# Patient Record
Sex: MALE | Race: WHITE | NOT HISPANIC OR LATINO
[De-identification: names, ages, dates, MRNs, and addresses within clinical notes are randomized per-mention and may not be internally consistent; named-entity substitution may affect disease eponyms.]

---

## 2018-10-26 PROBLEM — Z00.00 ENCOUNTER FOR PREVENTIVE HEALTH EXAMINATION: Status: ACTIVE | Noted: 2018-10-26

## 2018-10-31 ENCOUNTER — APPOINTMENT (OUTPATIENT)
Dept: SPINE | Facility: CLINIC | Age: 53
End: 2018-10-31
Payer: COMMERCIAL

## 2018-10-31 VITALS
WEIGHT: 230 LBS | SYSTOLIC BLOOD PRESSURE: 135 MMHG | HEART RATE: 90 BPM | DIASTOLIC BLOOD PRESSURE: 92 MMHG | HEIGHT: 67 IN | BODY MASS INDEX: 36.1 KG/M2

## 2018-10-31 DIAGNOSIS — Z86.79 PERSONAL HISTORY OF OTHER DISEASES OF THE CIRCULATORY SYSTEM: ICD-10-CM

## 2018-10-31 PROCEDURE — 99244 OFF/OP CNSLTJ NEW/EST MOD 40: CPT

## 2018-10-31 RX ORDER — QUETIAPINE 100 MG/1
100 TABLET, FILM COATED ORAL
Refills: 0 | Status: ACTIVE | COMMUNITY

## 2018-10-31 RX ORDER — BUPROPION HYDROCHLORIDE 150 MG/1
150 TABLET, EXTENDED RELEASE ORAL
Refills: 0 | Status: ACTIVE | COMMUNITY

## 2018-10-31 RX ORDER — BECLOMETHASONE DIPROPIONATE HFA 80 UG/1
80 AEROSOL, METERED RESPIRATORY (INHALATION)
Refills: 0 | Status: ACTIVE | COMMUNITY

## 2018-10-31 RX ORDER — MONTELUKAST SODIUM 10 MG/1
10 TABLET, FILM COATED ORAL
Refills: 0 | Status: ACTIVE | COMMUNITY

## 2018-10-31 RX ORDER — ERGOCALCIFEROL (VITAMIN D2) 1250 MCG
50000 CAPSULE ORAL
Refills: 0 | Status: ACTIVE | COMMUNITY

## 2018-10-31 RX ORDER — CITALOPRAM 40 MG/1
40 TABLET, FILM COATED ORAL
Refills: 0 | Status: ACTIVE | COMMUNITY

## 2018-10-31 RX ORDER — ONABOTULINUMTOXINA 200 [USP'U]/1
200 INJECTION, POWDER, LYOPHILIZED, FOR SOLUTION INTRADERMAL; INTRAMUSCULAR
Refills: 0 | Status: ACTIVE | COMMUNITY

## 2018-11-28 ENCOUNTER — OUTPATIENT (OUTPATIENT)
Dept: OUTPATIENT SERVICES | Facility: HOSPITAL | Age: 53
LOS: 1 days | End: 2018-11-28
Payer: COMMERCIAL

## 2018-11-28 VITALS
RESPIRATION RATE: 20 BRPM | TEMPERATURE: 98 F | HEART RATE: 74 BPM | OXYGEN SATURATION: 98 % | DIASTOLIC BLOOD PRESSURE: 74 MMHG | SYSTOLIC BLOOD PRESSURE: 137 MMHG | WEIGHT: 220.02 LBS | HEIGHT: 67 IN

## 2018-11-28 DIAGNOSIS — M54.42 LUMBAGO WITH SCIATICA, LEFT SIDE: ICD-10-CM

## 2018-11-28 DIAGNOSIS — I42.8 OTHER CARDIOMYOPATHIES: ICD-10-CM

## 2018-11-28 DIAGNOSIS — Z91.040 LATEX ALLERGY STATUS: ICD-10-CM

## 2018-11-28 DIAGNOSIS — Z98.1 ARTHRODESIS STATUS: Chronic | ICD-10-CM

## 2018-11-28 DIAGNOSIS — Z98.890 OTHER SPECIFIED POSTPROCEDURAL STATES: Chronic | ICD-10-CM

## 2018-11-28 DIAGNOSIS — Z01.818 ENCOUNTER FOR OTHER PREPROCEDURAL EXAMINATION: ICD-10-CM

## 2018-11-28 DIAGNOSIS — Z98.84 BARIATRIC SURGERY STATUS: Chronic | ICD-10-CM

## 2018-11-28 DIAGNOSIS — G47.33 OBSTRUCTIVE SLEEP APNEA (ADULT) (PEDIATRIC): ICD-10-CM

## 2018-11-28 LAB
MRSA PCR RESULT.: SIGNIFICANT CHANGE UP
S AUREUS DNA NOSE QL NAA+PROBE: SIGNIFICANT CHANGE UP

## 2018-11-28 RX ORDER — CEFAZOLIN SODIUM 1 G
2000 VIAL (EA) INJECTION ONCE
Qty: 0 | Refills: 0 | Status: DISCONTINUED | OUTPATIENT
Start: 2018-12-10 | End: 2018-12-25

## 2018-11-28 RX ORDER — LIDOCAINE HCL 20 MG/ML
0.2 VIAL (ML) INJECTION ONCE
Qty: 0 | Refills: 0 | Status: DISCONTINUED | OUTPATIENT
Start: 2018-12-10 | End: 2018-12-25

## 2018-11-28 NOTE — H&P PST ADULT - PMH
Asthma  mild - deneis any exacerbation  Chronic bilateral low back pain with left-sided sciatica    History of MI (myocardial infarction)  1998  IRVING (obstructive sleep apnea) Asthma  mild - denies  any exacerbation  Chronic bilateral low back pain with left-sided sciatica    Chronic left-sided low back pain with left-sided sciatica    GERD (gastroesophageal reflux disease)    History of MI (myocardial infarction)  1998  Lumbago with sciatica, left side    IRVING (obstructive sleep apnea)  CPAP  Valvular cardiomyopathy

## 2018-11-28 NOTE — H&P PST ADULT - HISTORY OF PRESENT ILLNESS
52 y/o gentle man who has had a long history of back pain since he was 28 years old s/p cervical fusion 2012/ front and back lumbar fusion in 2015 . Pt states that he has been experiencing severe back pain , radiating towards to right  groin and leg with weakness for 1 year followed by pain management with multiple conservative management with no improvement .  He ultimately had a spinal cord stimulator trial with improvement his back pain . Presents to  PST for scheduled Percutaneous Placement of spinal cord stimulator Battery on 12/10/2018.

## 2018-11-28 NOTE — H&P PST ADULT - PROBLEM SELECTOR PLAN 1
Percutaneous Placement of spinal cord stimulator Battery on 12/10/2018  Pre- Op instructions discussed   Labs done by PMD this week - will call for results   MRSA sent

## 2018-11-28 NOTE — H&P PST ADULT - PSH
[facility]  History of lumbar fusion  2015 - [facility]  S/P cervical spinal fusion  2012 ( [facility])  S/P insertion of spinal cord stimulator  Trial- 2018 History of gastric bypass  2007( [facility] )  History of lumbar fusion  2015 - [facility]  S/P cervical spinal fusion  2012 ( [facility])  S/P insertion of spinal cord stimulator  Trial- 2018

## 2018-11-28 NOTE — H&P PST ADULT - PRO PAIN LIFE ADAPT
inability to enjoy life/inability to concentrate/decreased appetite/decreased activity level/inability or reluctance to perform ADLs

## 2018-11-29 PROBLEM — G47.33 OBSTRUCTIVE SLEEP APNEA (ADULT) (PEDIATRIC): Chronic | Status: ACTIVE | Noted: 2018-11-28

## 2018-12-05 ENCOUNTER — OTHER (OUTPATIENT)
Age: 53
End: 2018-12-05

## 2018-12-09 ENCOUNTER — TRANSCRIPTION ENCOUNTER (OUTPATIENT)
Age: 53
End: 2018-12-09

## 2018-12-09 VITALS
OXYGEN SATURATION: 100 % | SYSTOLIC BLOOD PRESSURE: 123 MMHG | RESPIRATION RATE: 20 BRPM | WEIGHT: 220.02 LBS | TEMPERATURE: 98 F | HEART RATE: 86 BPM | HEIGHT: 67 IN | DIASTOLIC BLOOD PRESSURE: 82 MMHG

## 2018-12-09 NOTE — PRE-ANESTHESIA EVALUATION ADULT - NSANTHADDINFOFT_GEN_ALL_CORE
-post-op IRVING precautions -post-op IRVING precautions  -C-spine precautions  -possible difficult airway (d/w pt)

## 2018-12-10 ENCOUNTER — APPOINTMENT (OUTPATIENT)
Dept: SPINE | Facility: HOSPITAL | Age: 53
End: 2018-12-10

## 2018-12-10 ENCOUNTER — OUTPATIENT (OUTPATIENT)
Dept: OUTPATIENT SERVICES | Facility: HOSPITAL | Age: 53
LOS: 1 days | End: 2018-12-10
Payer: COMMERCIAL

## 2018-12-10 VITALS
HEART RATE: 95 BPM | DIASTOLIC BLOOD PRESSURE: 75 MMHG | TEMPERATURE: 98 F | OXYGEN SATURATION: 97 % | SYSTOLIC BLOOD PRESSURE: 126 MMHG | RESPIRATION RATE: 18 BRPM

## 2018-12-10 DIAGNOSIS — Z01.818 ENCOUNTER FOR OTHER PREPROCEDURAL EXAMINATION: ICD-10-CM

## 2018-12-10 DIAGNOSIS — Z98.1 ARTHRODESIS STATUS: Chronic | ICD-10-CM

## 2018-12-10 DIAGNOSIS — M54.42 LUMBAGO WITH SCIATICA, LEFT SIDE: ICD-10-CM

## 2018-12-10 DIAGNOSIS — Z98.890 OTHER SPECIFIED POSTPROCEDURAL STATES: Chronic | ICD-10-CM

## 2018-12-10 DIAGNOSIS — Z98.84 BARIATRIC SURGERY STATUS: Chronic | ICD-10-CM

## 2018-12-10 PROCEDURE — 76000 FLUOROSCOPY <1 HR PHYS/QHP: CPT

## 2018-12-10 PROCEDURE — 95972 ALYS CPLX SP/PN NPGT W/PRGRM: CPT

## 2018-12-10 PROCEDURE — 93005 ELECTROCARDIOGRAM TRACING: CPT

## 2018-12-10 PROCEDURE — G0463: CPT

## 2018-12-10 PROCEDURE — 63685 INS/RPLC SPI NPG/RCVR POCKET: CPT

## 2018-12-10 PROCEDURE — 87641 MR-STAPH DNA AMP PROBE: CPT

## 2018-12-10 PROCEDURE — C1787: CPT

## 2018-12-10 PROCEDURE — 63650 IMPLANT NEUROELECTRODES: CPT

## 2018-12-10 PROCEDURE — 94660 CPAP INITIATION&MGMT: CPT

## 2018-12-10 PROCEDURE — C1820: CPT

## 2018-12-10 PROCEDURE — C1889: CPT

## 2018-12-10 PROCEDURE — 87640 STAPH A DNA AMP PROBE: CPT

## 2018-12-10 PROCEDURE — C1778: CPT

## 2018-12-10 PROCEDURE — 93010 ELECTROCARDIOGRAM REPORT: CPT

## 2018-12-10 RX ORDER — ONDANSETRON 8 MG/1
4 TABLET, FILM COATED ORAL ONCE
Qty: 0 | Refills: 0 | Status: DISCONTINUED | OUTPATIENT
Start: 2018-12-10 | End: 2018-12-10

## 2018-12-10 RX ORDER — OXYCODONE HYDROCHLORIDE 5 MG/1
10 TABLET ORAL ONCE
Qty: 0 | Refills: 0 | Status: DISCONTINUED | OUTPATIENT
Start: 2018-12-10 | End: 2018-12-10

## 2018-12-10 RX ORDER — METOCLOPRAMIDE HCL 10 MG
10 TABLET ORAL ONCE
Qty: 0 | Refills: 0 | Status: DISCONTINUED | OUTPATIENT
Start: 2018-12-10 | End: 2018-12-10

## 2018-12-10 RX ORDER — OXYCODONE HYDROCHLORIDE 5 MG/1
5 TABLET ORAL ONCE
Qty: 0 | Refills: 0 | Status: DISCONTINUED | OUTPATIENT
Start: 2018-12-10 | End: 2018-12-10

## 2018-12-10 RX ORDER — HYDROMORPHONE HYDROCHLORIDE 2 MG/ML
0.5 INJECTION INTRAMUSCULAR; INTRAVENOUS; SUBCUTANEOUS ONCE
Qty: 0 | Refills: 0 | Status: DISCONTINUED | OUTPATIENT
Start: 2018-12-10 | End: 2018-12-10

## 2018-12-10 RX ORDER — ASPIRIN/CALCIUM CARB/MAGNESIUM 324 MG
81 TABLET ORAL DAILY
Qty: 0 | Refills: 0 | Status: DISCONTINUED | OUTPATIENT
Start: 2018-12-10 | End: 2018-12-25

## 2018-12-10 RX ORDER — HYDROMORPHONE HYDROCHLORIDE 2 MG/ML
0.5 INJECTION INTRAMUSCULAR; INTRAVENOUS; SUBCUTANEOUS
Qty: 0 | Refills: 0 | Status: DISCONTINUED | OUTPATIENT
Start: 2018-12-10 | End: 2018-12-10

## 2018-12-10 RX ORDER — CYCLOBENZAPRINE HYDROCHLORIDE 10 MG/1
10 TABLET, FILM COATED ORAL THREE TIMES A DAY
Qty: 0 | Refills: 0 | Status: DISCONTINUED | OUTPATIENT
Start: 2018-12-10 | End: 2018-12-25

## 2018-12-10 RX ORDER — SODIUM CHLORIDE 9 MG/ML
1000 INJECTION, SOLUTION INTRAVENOUS
Qty: 0 | Refills: 0 | Status: DISCONTINUED | OUTPATIENT
Start: 2018-12-10 | End: 2018-12-25

## 2018-12-10 RX ORDER — MONTELUKAST 4 MG/1
10 TABLET, CHEWABLE ORAL AT BEDTIME
Qty: 0 | Refills: 0 | Status: DISCONTINUED | OUTPATIENT
Start: 2018-12-10 | End: 2018-12-25

## 2018-12-10 RX ORDER — CEPHALEXIN 500 MG
500 CAPSULE ORAL
Qty: 0 | Refills: 0 | Status: DISCONTINUED | OUTPATIENT
Start: 2018-12-10 | End: 2018-12-10

## 2018-12-10 RX ORDER — SODIUM CHLORIDE 9 MG/ML
3 INJECTION INTRAMUSCULAR; INTRAVENOUS; SUBCUTANEOUS EVERY 8 HOURS
Qty: 0 | Refills: 0 | Status: DISCONTINUED | OUTPATIENT
Start: 2018-12-10 | End: 2018-12-10

## 2018-12-10 RX ORDER — CITALOPRAM 10 MG/1
40 TABLET, FILM COATED ORAL DAILY
Qty: 0 | Refills: 0 | Status: DISCONTINUED | OUTPATIENT
Start: 2018-12-10 | End: 2018-12-25

## 2018-12-10 RX ORDER — CEPHALEXIN 500 MG
1 CAPSULE ORAL
Qty: 4 | Refills: 0 | OUTPATIENT
Start: 2018-12-10

## 2018-12-10 RX ORDER — QUETIAPINE FUMARATE 200 MG/1
100 TABLET, FILM COATED ORAL AT BEDTIME
Qty: 0 | Refills: 0 | Status: DISCONTINUED | OUTPATIENT
Start: 2018-12-10 | End: 2018-12-25

## 2018-12-10 RX ORDER — OXYCODONE HYDROCHLORIDE 5 MG/1
1 TABLET ORAL
Qty: 6 | Refills: 0 | OUTPATIENT
Start: 2018-12-10

## 2018-12-10 RX ORDER — GABAPENTIN 400 MG/1
300 CAPSULE ORAL ONCE
Qty: 0 | Refills: 0 | Status: DISCONTINUED | OUTPATIENT
Start: 2018-12-10 | End: 2018-12-10

## 2018-12-10 RX ORDER — ACETAMINOPHEN 500 MG
1000 TABLET ORAL ONCE
Qty: 0 | Refills: 0 | Status: COMPLETED | OUTPATIENT
Start: 2018-12-10 | End: 2018-12-10

## 2018-12-10 RX ADMIN — SODIUM CHLORIDE 100 MILLILITER(S): 9 INJECTION, SOLUTION INTRAVENOUS at 14:15

## 2018-12-10 RX ADMIN — OXYCODONE HYDROCHLORIDE 10 MILLIGRAM(S): 5 TABLET ORAL at 16:23

## 2018-12-10 RX ADMIN — CYCLOBENZAPRINE HYDROCHLORIDE 10 MILLIGRAM(S): 10 TABLET, FILM COATED ORAL at 16:20

## 2018-12-10 RX ADMIN — Medication 400 MILLIGRAM(S): at 17:06

## 2018-12-10 NOTE — ASU DISCHARGE PLAN (ADULT/PEDIATRIC). - DRESSING FT
You may remove the outer dressings on Wednesday and shower on Thursday. Let the SteriStrips fall off

## 2018-12-10 NOTE — ASU DISCHARGE PLAN (ADULT/PEDIATRIC). - NURSING INSTRUCTIONS
your last dose of 1000mg tylenol was at 5pm  your last dose of flexaril and 10mg oxycodone was at 4pm

## 2018-12-10 NOTE — ASU DISCHARGE PLAN (ADULT/PEDIATRIC). - MEDICATION SUMMARY - MEDICATIONS TO TAKE
I will START or STAY ON the medications listed below when I get home from the hospital:    aspirin 81 mg oral tablet  -- 1 tab(s) by mouth once a day prophylaxis  -- Indication: For Heart disease    oxyCODONE 5 mg oral tablet  -- 1 tab(s) by mouth every 6 hours, As Needed  -- Indication: For Pain - In the postoperative period, you may take this medication every 4 to 6 hours.     oxyCODONE 5 mg oral tablet  -- 1 tab(s) by mouth every 4 to 6 hours, As Needed -Moderate Pain (4 - 6) MDD:6 tablets  -- Indication: For Pain - In the postoperative period, you may take this medication every 4 to 6 hours.    CeleXA 40 mg oral tablet  -- 1 tab(s) by mouth once a day (at bedtime)  -- Indication: For Antidepressant    SEROquel 100 mg oral tablet  -- 1 tab(s) by mouth once a day (at bedtime)  -- Indication: For Mood    cephalexin 500 mg oral capsule  -- 1 cap(s) by mouth 4 times a day for 4 doses   -- Indication: For Postoperative antibiotic     Singulair 10 mg oral tablet  -- 1 tab(s) by mouth once a day (at bedtime)  -- Indication: For Allergies    Flexeril 10 mg oral tablet  -- 1 tab(s) by mouth 3 times a day, As Needed  -- Indication: For Back spasms    Multiple Vitamins oral tablet  -- 1 tab(s) by mouth once a day  -- Indication: For Home medication    Vitamin B-12 100 mcg oral tablet  -- 1 tab(s) by mouth once a day  -- Indication: For Home medication    Vitamin D3  -- every 2 weeks  -- Indication: For Home medication

## 2018-12-10 NOTE — ASU DISCHARGE PLAN (ADULT/PEDIATRIC). - NOTIFY
Fever greater than 101/Unable to Urinate/Pain not relieved by Medications/Bleeding that does not stop/Swelling that continues/Inability to Tolerate Liquids or Foods

## 2018-12-10 NOTE — BRIEF OPERATIVE NOTE - PROCEDURE
<<-----Click on this checkbox to enter Procedure Implantation of spinal cord nerve stimulator  12/10/2018    Active  [id number]

## 2018-12-10 NOTE — ASU DISCHARGE PLAN (ADULT/PEDIATRIC). - MEDICATION SUMMARY - MEDICATIONS TO CHANGE
I will SWITCH the dose or number of times a day I take the medications listed below when I get home from the hospital:    oxyCODONE 5 mg oral tablet  -- 1 tab(s) by mouth every 6 hours, As Needed

## 2018-12-11 PROBLEM — K21.9 GASTRO-ESOPHAGEAL REFLUX DISEASE WITHOUT ESOPHAGITIS: Chronic | Status: ACTIVE | Noted: 2018-11-28

## 2018-12-12 ENCOUNTER — TRANSCRIPTION ENCOUNTER (OUTPATIENT)
Age: 53
End: 2018-12-12

## 2018-12-17 ENCOUNTER — OTHER (OUTPATIENT)
Age: 53
End: 2018-12-17

## 2018-12-21 ENCOUNTER — APPOINTMENT (OUTPATIENT)
Dept: SPINE | Facility: CLINIC | Age: 53
End: 2018-12-21
Payer: COMMERCIAL

## 2018-12-21 VITALS
HEIGHT: 67 IN | SYSTOLIC BLOOD PRESSURE: 122 MMHG | HEART RATE: 106 BPM | BODY MASS INDEX: 36.1 KG/M2 | DIASTOLIC BLOOD PRESSURE: 76 MMHG | TEMPERATURE: 98.3 F | WEIGHT: 230 LBS

## 2018-12-21 PROCEDURE — 95972 ALYS CPLX SP/PN NPGT W/PRGRM: CPT

## 2018-12-21 PROCEDURE — 99213 OFFICE O/P EST LOW 20 MIN: CPT

## 2018-12-21 RX ORDER — PNV NO.95/FERROUS FUM/FOLIC AC 28MG-0.8MG
TABLET ORAL
Refills: 0 | Status: ACTIVE | COMMUNITY

## 2018-12-21 RX ORDER — MULTIVIT-MIN/IRON/FOLIC ACID/K 18-600-40
CAPSULE ORAL
Refills: 0 | Status: ACTIVE | COMMUNITY

## 2019-02-08 ENCOUNTER — CLINICAL ADVICE (OUTPATIENT)
Age: 54
End: 2019-02-08

## 2019-02-12 ENCOUNTER — TRANSCRIPTION ENCOUNTER (OUTPATIENT)
Age: 54
End: 2019-02-12

## 2019-02-13 ENCOUNTER — APPOINTMENT (OUTPATIENT)
Dept: SPINE | Facility: CLINIC | Age: 54
End: 2019-02-13
Payer: COMMERCIAL

## 2019-02-13 VITALS
BODY MASS INDEX: 35.31 KG/M2 | OXYGEN SATURATION: 98 % | HEART RATE: 91 BPM | SYSTOLIC BLOOD PRESSURE: 118 MMHG | TEMPERATURE: 98.2 F | WEIGHT: 225 LBS | DIASTOLIC BLOOD PRESSURE: 75 MMHG | RESPIRATION RATE: 15 BRPM | HEIGHT: 67 IN

## 2019-02-13 PROCEDURE — 99213 OFFICE O/P EST LOW 20 MIN: CPT

## 2019-02-13 RX ORDER — CYCLOBENZAPRINE HYDROCHLORIDE 10 MG/1
10 TABLET, FILM COATED ORAL
Refills: 0 | Status: DISCONTINUED | COMMUNITY
End: 2019-02-13

## 2019-02-13 RX ORDER — OXYCODONE 5 MG/1
5 TABLET ORAL
Refills: 0 | Status: DISCONTINUED | COMMUNITY
End: 2019-02-13

## 2019-03-05 ENCOUNTER — OTHER (OUTPATIENT)
Age: 54
End: 2019-03-05

## 2019-03-27 ENCOUNTER — APPOINTMENT (OUTPATIENT)
Dept: SPINE | Facility: CLINIC | Age: 54
End: 2019-03-27
Payer: COMMERCIAL

## 2019-03-27 VITALS
HEART RATE: 96 BPM | BODY MASS INDEX: 35.31 KG/M2 | DIASTOLIC BLOOD PRESSURE: 76 MMHG | WEIGHT: 225 LBS | HEIGHT: 67 IN | SYSTOLIC BLOOD PRESSURE: 138 MMHG

## 2019-03-27 PROCEDURE — 99214 OFFICE O/P EST MOD 30 MIN: CPT

## 2019-04-26 ENCOUNTER — OUTPATIENT (OUTPATIENT)
Dept: OUTPATIENT SERVICES | Facility: HOSPITAL | Age: 54
LOS: 1 days | End: 2019-04-26
Payer: COMMERCIAL

## 2019-04-26 VITALS
HEART RATE: 68 BPM | RESPIRATION RATE: 16 BRPM | SYSTOLIC BLOOD PRESSURE: 124 MMHG | TEMPERATURE: 98 F | WEIGHT: 218.04 LBS | HEIGHT: 66 IN | DIASTOLIC BLOOD PRESSURE: 87 MMHG | OXYGEN SATURATION: 98 %

## 2019-04-26 DIAGNOSIS — Z98.1 ARTHRODESIS STATUS: Chronic | ICD-10-CM

## 2019-04-26 DIAGNOSIS — Z98.890 OTHER SPECIFIED POSTPROCEDURAL STATES: Chronic | ICD-10-CM

## 2019-04-26 DIAGNOSIS — Z98.84 BARIATRIC SURGERY STATUS: Chronic | ICD-10-CM

## 2019-04-26 DIAGNOSIS — Z01.818 ENCOUNTER FOR OTHER PREPROCEDURAL EXAMINATION: ICD-10-CM

## 2019-04-26 DIAGNOSIS — Z98.1 ARTHRODESIS STATUS: ICD-10-CM

## 2019-04-26 DIAGNOSIS — M51.26 OTHER INTERVERTEBRAL DISC DISPLACEMENT, LUMBAR REGION: ICD-10-CM

## 2019-04-26 DIAGNOSIS — G47.33 OBSTRUCTIVE SLEEP APNEA (ADULT) (PEDIATRIC): ICD-10-CM

## 2019-04-26 LAB
ANION GAP SERPL CALC-SCNC: 12 MMOL/L — SIGNIFICANT CHANGE UP (ref 5–17)
BUN SERPL-MCNC: 23 MG/DL — SIGNIFICANT CHANGE UP (ref 7–23)
CALCIUM SERPL-MCNC: 9.8 MG/DL — SIGNIFICANT CHANGE UP (ref 8.4–10.5)
CHLORIDE SERPL-SCNC: 102 MMOL/L — SIGNIFICANT CHANGE UP (ref 96–108)
CO2 SERPL-SCNC: 27 MMOL/L — SIGNIFICANT CHANGE UP (ref 22–31)
CREAT SERPL-MCNC: 0.92 MG/DL — SIGNIFICANT CHANGE UP (ref 0.5–1.3)
GLUCOSE SERPL-MCNC: 88 MG/DL — SIGNIFICANT CHANGE UP (ref 70–99)
HCT VFR BLD CALC: 42.4 % — SIGNIFICANT CHANGE UP (ref 39–50)
HGB BLD-MCNC: 13.4 G/DL — SIGNIFICANT CHANGE UP (ref 13–17)
MCHC RBC-ENTMCNC: 31.3 PG — SIGNIFICANT CHANGE UP (ref 27–34)
MCHC RBC-ENTMCNC: 31.6 GM/DL — LOW (ref 32–36)
MCV RBC AUTO: 99.1 FL — SIGNIFICANT CHANGE UP (ref 80–100)
MRSA PCR RESULT.: SIGNIFICANT CHANGE UP
PLATELET # BLD AUTO: 257 K/UL — SIGNIFICANT CHANGE UP (ref 150–400)
POTASSIUM SERPL-MCNC: 4.6 MMOL/L — SIGNIFICANT CHANGE UP (ref 3.5–5.3)
POTASSIUM SERPL-SCNC: 4.6 MMOL/L — SIGNIFICANT CHANGE UP (ref 3.5–5.3)
RBC # BLD: 4.28 M/UL — SIGNIFICANT CHANGE UP (ref 4.2–5.8)
RBC # FLD: 13.3 % — SIGNIFICANT CHANGE UP (ref 10.3–14.5)
S AUREUS DNA NOSE QL NAA+PROBE: SIGNIFICANT CHANGE UP
SODIUM SERPL-SCNC: 141 MMOL/L — SIGNIFICANT CHANGE UP (ref 135–145)
WBC # BLD: 5.92 K/UL — SIGNIFICANT CHANGE UP (ref 3.8–10.5)
WBC # FLD AUTO: 5.92 K/UL — SIGNIFICANT CHANGE UP (ref 3.8–10.5)

## 2019-04-26 PROCEDURE — 87640 STAPH A DNA AMP PROBE: CPT

## 2019-04-26 PROCEDURE — 93010 ELECTROCARDIOGRAM REPORT: CPT

## 2019-04-26 PROCEDURE — 93005 ELECTROCARDIOGRAM TRACING: CPT

## 2019-04-26 PROCEDURE — G0463: CPT

## 2019-04-26 PROCEDURE — 80048 BASIC METABOLIC PNL TOTAL CA: CPT

## 2019-04-26 PROCEDURE — 85027 COMPLETE CBC AUTOMATED: CPT

## 2019-04-26 RX ORDER — OXYCODONE HYDROCHLORIDE 5 MG/1
1 TABLET ORAL
Qty: 0 | Refills: 0 | COMMUNITY

## 2019-04-26 RX ORDER — CEFAZOLIN SODIUM 1 G
2000 VIAL (EA) INJECTION ONCE
Refills: 0 | Status: DISCONTINUED | OUTPATIENT
Start: 2019-05-10 | End: 2019-05-25

## 2019-04-26 RX ORDER — ASPIRIN/CALCIUM CARB/MAGNESIUM 324 MG
1 TABLET ORAL
Qty: 0 | Refills: 0 | COMMUNITY

## 2019-04-26 RX ORDER — CHOLECALCIFEROL (VITAMIN D3) 125 MCG
0 CAPSULE ORAL
Qty: 0 | Refills: 0 | COMMUNITY

## 2019-04-26 RX ORDER — PREGABALIN 225 MG/1
1 CAPSULE ORAL
Qty: 0 | Refills: 0 | COMMUNITY

## 2019-04-26 NOTE — H&P PST ADULT - PRO PAIN LIFE ADAPT
decreased activity level/decreased appetite/inability to concentrate/inability or reluctance to perform ADLs/inability to enjoy life

## 2019-04-26 NOTE — H&P PST ADULT - VISION (WITH CORRECTIVE LENSES IF THE PATIENT USUALLY WEARS THEM):
Normal vision: sees adequately in most situations; can see medication labels, newsprint reading glasses/Partially impaired: cannot see medication labels or newsprint, but can see obstacles in path, and the surrounding layout; can count fingers at arm's length

## 2019-04-26 NOTE — H&P PST ADULT - NSICDXPROBLEM_GEN_ALL_CORE_FT
PROBLEM DIAGNOSES  Problem: Lumbar herniated disc  Assessment and Plan: Right L2-3 Lumbar Laminectomy/ Disectomy    Problem: IRVING (obstructive sleep apnea)  Assessment and Plan: Observe IRVING precautions PROBLEM DIAGNOSES  Problem: Lumbar herniated disc  Assessment and Plan: Right L2-3 Lumbar Laminectomy/ Disectomy    * preop Medical evaluation done 4-25-19 with Dr. Ma, PMD    Problem: IRVING (obstructive sleep apnea)  Assessment and Plan: Observe IRVING precautions    Problem: S/P spinal fusion  Assessment and Plan: Caution with positioning

## 2019-04-26 NOTE — H&P PST ADULT - NSICDXPASTSURGICALHX_GEN_ALL_CORE_FT
PAST SURGICAL HISTORY:  [facility]    [facility] fusion 2015 - [facility]    S/P cervical spinal fusion 2012 ( [facility])    S/P insertion of spinal cord stimulator Trial- 2018 PAST SURGICAL HISTORY:  [facility] of [facility]    [facility] of lumbar fusion 2015 - [facility]    S/P cervical spinal fusion 2012 ( [facility])    S/P insertion of spinal cord stimulator Trial- 2018 PAST SURGICAL HISTORY:  [facility] of [facility]    [facility] of lumbar fusion 2015 - [facility]    S/P cervical spinal fusion 2012 ( [facility])    S/P insertion of spinal cord stimulator Trial- 2018 PAST SURGICAL HISTORY:  [facility] of [facility]    [facility] of lumbar fusion 2015 - [facility]    S/P cervical spinal fusion 2012 ( [facility])    S/P insertion of spinal cord stimulator Trial- 2018

## 2019-04-26 NOTE — H&P PST ADULT - HISTORY OF PRESENT ILLNESS
54 y/o gentle man who has had a long history of back pain since he was 28 years old s/p cervical fusion 2012/ front and back lumbar fusion in 2015 . Pt states that he has been experiencing severe back pain , radiating towards to right  groin and leg with weakness for 1 year followed by pain management with multiple conservative management with no improvement .  He ultimately had a spinal cord stimulator trial with improvement his back pain . Presents to  PST for scheduled Percutaneous Placement of spinal cord stimulator Battery on 12/10/2018. 55 y/o gentle man who has had a long history of back pain since he was 28 years old s/p cervical fusion 2012/ front and back lumbar fusion in 2015 . Pt states that he has been experiencing severe back pain , radiating towards to right  groin and leg with weakness for 1 year followed by pain management with multiple conservative management with no improvement .  He ultimately had a spinal cord stimulator trial with improvement his back pain . Presents to  PST for scheduled Percutaneous Placement of spinal cord stimulator Battery on 12/10/2018. This is a 55 year old male with PMH: 53 y/o gentle man who has had a long history of back pain since he was 28 years old s/p cervical fusion 2012/ front and back lumbar fusion in 2015 . Pt states that he has been experiencing severe back pain , radiating towards to right  groin and leg with weakness for 1 year followed by pain management with multiple conservative management with no improvement .  He ultimately had a spinal cord stimulator trial with improvement his back pain . Presents to  PST for scheduled Percutaneous Placement of spinal cord stimulator Battery on 12/10/2018. This is a 55 year old male with PMH: mild Asthma, IRVING: uses CPAP nightly, s/p ( 07): Rou-en-y Gastric Bypass for Morbid Obesity: current BMI 35.2,  ('98): + MI @ age 32: Cardiac Workup: NO obstructive coronary disease, NO intervention done, HTN, HLD, Anxiety, Depression, Migraine, Herniated Cervical Disc : s/p ('12): Cervical Fusion:  current * limited ROM of Neck, ('15): Lumbar Herniated Disc: s/p Lumbar Fusion. Lumbar radiculopathy persists: s/p ( 11/2018): Spinal Cord Stimulator Trial with good pain relief. Had accidental fall 2 weeks after with lumbar injury. Permanent Spinal Cord Stimulator placed few weeks after: No pain relief. evaluated, dx: Herniated Lumbar Disc. Now scheduled: Right L2-3 Lumbar Laminectomy Discectomy.

## 2019-04-26 NOTE — H&P PST ADULT - OTHER CARE PROVIDERS
cardiology   -  Dr Saurabh Chen  [phone]  , Pain management  Dr Rodriguez  [phone] cardiology   -  Dr Abhinav Chen  [phone]  , Pain management  Dr Rodriguez  [phone]    [facility]

## 2019-04-26 NOTE — H&P PST ADULT - NSICDXPASTMEDICALHX_GEN_ALL_CORE_FT
PAST MEDICAL HISTORY:  Asthma mild - denies  any exacerbation    Chronic bilateral low back pain with left-sided sciatica     Chronic left-sided low back pain with left-sided sciatica     GERD (gastroesophageal reflux disease)     History of MI (myocardial infarction) 1998    Lumbago with sciatica, left side     IRVING (obstructive sleep apnea) CPAP    Valvular cardiomyopathy PAST MEDICAL HISTORY:  Asthma mild    Chronic bilateral low back pain with left-sided sciatica     Chronic left-sided low back pain with left-sided sciatica     GERD (gastroesophageal reflux disease)     History of MI (myocardial infarction) 1998    Insomnia     Lumbago with sciatica, left side     Morbid obesity surgically treated '2007:  Gastric Bypass    IRVING (obstructive sleep apnea) CPAP    Valvular cardiomyopathy     Vitamin D deficiency PAST MEDICAL HISTORY:  Anxiety and depression     Asthma mild    Chronic bilateral low back pain with left-sided sciatica     Chronic left-sided low back pain with left-sided sciatica     GERD (gastroesophageal reflux disease)     History of MI (myocardial infarction) 1998  @ age 32    Hyperlipidemia     Insomnia     Iron deficiency anemia     Kidney stones ' 2010  passed. no surgery    Lumbago with sciatica, left side     Lumbar herniated disc     Migraine meds prn and Botox injections every 3 months    Morbid obesity surgically treated '2007:  Gastric Bypass    IRVING (obstructive sleep apnea) CPAP    Radiculopathy of lumbar region     Valvular cardiomyopathy     Vitamin D deficiency PAST MEDICAL HISTORY:  Anxiety and depression     Asthma mild    GERD (gastroesophageal reflux disease)     History of MI (myocardial infarction) 1998  @ age 32    Hyperlipidemia     Insomnia     Iron deficiency anemia     Kidney stones ' 2010  passed. no surgery    Lumbar herniated disc     Migraine meds prn and Botox injections every 3 months    Morbid obesity surgically treated '2007:  Gastric Bypass    IRVING (obstructive sleep apnea) CPAP    Radiculopathy of lumbar region     Vitamin D deficiency

## 2019-04-27 PROBLEM — E66.01 MORBID (SEVERE) OBESITY DUE TO EXCESS CALORIES: Chronic | Status: ACTIVE | Noted: 2019-04-26

## 2019-04-27 PROBLEM — N20.0 CALCULUS OF KIDNEY: Chronic | Status: ACTIVE | Noted: 2019-04-26

## 2019-04-27 PROBLEM — G43.909 MIGRAINE, UNSPECIFIED, NOT INTRACTABLE, WITHOUT STATUS MIGRAINOSUS: Chronic | Status: ACTIVE | Noted: 2019-04-26

## 2019-04-27 PROBLEM — M54.42 LUMBAGO WITH SCIATICA, LEFT SIDE: Chronic | Status: INACTIVE | Noted: 2018-11-28 | Resolved: 2019-04-26

## 2019-04-27 PROBLEM — J45.909 UNSPECIFIED ASTHMA, UNCOMPLICATED: Chronic | Status: ACTIVE | Noted: 2018-11-28

## 2019-04-27 PROBLEM — I42.8 OTHER CARDIOMYOPATHIES: Chronic | Status: INACTIVE | Noted: 2018-11-28 | Resolved: 2019-04-26

## 2019-04-27 PROBLEM — I25.2 OLD MYOCARDIAL INFARCTION: Chronic | Status: ACTIVE | Noted: 2018-11-28

## 2019-05-09 ENCOUNTER — TRANSCRIPTION ENCOUNTER (OUTPATIENT)
Age: 54
End: 2019-05-09

## 2019-05-10 ENCOUNTER — OUTPATIENT (OUTPATIENT)
Dept: OUTPATIENT SERVICES | Facility: HOSPITAL | Age: 54
LOS: 1 days | End: 2019-05-10
Payer: COMMERCIAL

## 2019-05-10 ENCOUNTER — APPOINTMENT (OUTPATIENT)
Dept: SPINE | Facility: HOSPITAL | Age: 54
End: 2019-05-10

## 2019-05-10 VITALS
HEART RATE: 80 BPM | HEIGHT: 66 IN | TEMPERATURE: 98 F | DIASTOLIC BLOOD PRESSURE: 84 MMHG | WEIGHT: 218.04 LBS | OXYGEN SATURATION: 99 % | RESPIRATION RATE: 16 BRPM | SYSTOLIC BLOOD PRESSURE: 130 MMHG

## 2019-05-10 VITALS
TEMPERATURE: 98 F | SYSTOLIC BLOOD PRESSURE: 118 MMHG | DIASTOLIC BLOOD PRESSURE: 70 MMHG | RESPIRATION RATE: 16 BRPM | OXYGEN SATURATION: 98 % | HEART RATE: 101 BPM

## 2019-05-10 DIAGNOSIS — Z98.890 OTHER SPECIFIED POSTPROCEDURAL STATES: Chronic | ICD-10-CM

## 2019-05-10 DIAGNOSIS — M51.26 OTHER INTERVERTEBRAL DISC DISPLACEMENT, LUMBAR REGION: ICD-10-CM

## 2019-05-10 DIAGNOSIS — Z98.1 ARTHRODESIS STATUS: Chronic | ICD-10-CM

## 2019-05-10 DIAGNOSIS — Z98.84 BARIATRIC SURGERY STATUS: Chronic | ICD-10-CM

## 2019-05-10 PROCEDURE — C1769: CPT

## 2019-05-10 PROCEDURE — C1889: CPT

## 2019-05-10 PROCEDURE — 63030 LAMOT DCMPRN NRV RT 1 LMBR: CPT

## 2019-05-10 PROCEDURE — 63030 LAMOT DCMPRN NRV RT 1 LMBR: CPT | Mod: RT

## 2019-05-10 PROCEDURE — 76000 FLUOROSCOPY <1 HR PHYS/QHP: CPT

## 2019-05-10 RX ORDER — CYCLOBENZAPRINE HYDROCHLORIDE 10 MG/1
1 TABLET, FILM COATED ORAL
Qty: 0 | Refills: 0 | DISCHARGE

## 2019-05-10 RX ORDER — ONDANSETRON 8 MG/1
4 TABLET, FILM COATED ORAL EVERY 4 HOURS
Refills: 0 | Status: DISCONTINUED | OUTPATIENT
Start: 2019-05-10 | End: 2019-05-10

## 2019-05-10 RX ORDER — CEPHALEXIN 500 MG
1 CAPSULE ORAL
Qty: 10 | Refills: 0
Start: 2019-05-10 | End: 2019-05-14

## 2019-05-10 RX ORDER — CHLORHEXIDINE GLUCONATE 213 G/1000ML
1 SOLUTION TOPICAL DAILY
Refills: 0 | Status: DISCONTINUED | OUTPATIENT
Start: 2019-05-10 | End: 2019-05-10

## 2019-05-10 RX ORDER — LANOLIN ALCOHOL/MO/W.PET/CERES
1 CREAM (GRAM) TOPICAL
Qty: 0 | Refills: 0 | DISCHARGE

## 2019-05-10 RX ORDER — MONTELUKAST 4 MG/1
10 TABLET, CHEWABLE ORAL AT BEDTIME
Refills: 0 | Status: DISCONTINUED | OUTPATIENT
Start: 2019-05-10 | End: 2019-05-25

## 2019-05-10 RX ORDER — SODIUM CHLORIDE 9 MG/ML
3 INJECTION INTRAMUSCULAR; INTRAVENOUS; SUBCUTANEOUS EVERY 8 HOURS
Refills: 0 | Status: DISCONTINUED | OUTPATIENT
Start: 2019-05-10 | End: 2019-05-10

## 2019-05-10 RX ORDER — QUETIAPINE FUMARATE 200 MG/1
1 TABLET, FILM COATED ORAL
Qty: 0 | Refills: 0 | DISCHARGE

## 2019-05-10 RX ORDER — ACETAMINOPHEN 500 MG
650 TABLET ORAL EVERY 6 HOURS
Refills: 0 | Status: DISCONTINUED | OUTPATIENT
Start: 2019-05-10 | End: 2019-05-25

## 2019-05-10 RX ORDER — SODIUM CHLORIDE 9 MG/ML
1000 INJECTION INTRAMUSCULAR; INTRAVENOUS; SUBCUTANEOUS
Refills: 0 | Status: DISCONTINUED | OUTPATIENT
Start: 2019-05-10 | End: 2019-05-25

## 2019-05-10 RX ORDER — OXYCODONE HYDROCHLORIDE 5 MG/1
1 TABLET ORAL
Qty: 30 | Refills: 0
Start: 2019-05-10 | End: 2019-05-14

## 2019-05-10 RX ORDER — BECLOMETHASONE DIPROPIONATE 40 UG/1
2 AEROSOL, METERED RESPIRATORY (INHALATION)
Qty: 0 | Refills: 0 | DISCHARGE

## 2019-05-10 RX ORDER — PREGABALIN 225 MG/1
2 CAPSULE ORAL
Qty: 0 | Refills: 0 | DISCHARGE

## 2019-05-10 RX ORDER — MONTELUKAST 4 MG/1
1 TABLET, CHEWABLE ORAL
Qty: 0 | Refills: 0 | DISCHARGE

## 2019-05-10 RX ORDER — ATORVASTATIN CALCIUM 80 MG/1
1 TABLET, FILM COATED ORAL
Qty: 0 | Refills: 0 | DISCHARGE

## 2019-05-10 RX ORDER — OXYCODONE HYDROCHLORIDE 5 MG/1
5 TABLET ORAL EVERY 4 HOURS
Refills: 0 | Status: COMPLETED | OUTPATIENT
Start: 2019-05-10 | End: 2019-05-17

## 2019-05-10 RX ORDER — CYCLOBENZAPRINE HYDROCHLORIDE 10 MG/1
10 TABLET, FILM COATED ORAL THREE TIMES A DAY
Refills: 0 | Status: DISCONTINUED | OUTPATIENT
Start: 2019-05-10 | End: 2019-05-25

## 2019-05-10 RX ORDER — HYDROMORPHONE HYDROCHLORIDE 2 MG/ML
0.25 INJECTION INTRAMUSCULAR; INTRAVENOUS; SUBCUTANEOUS
Refills: 0 | Status: DISCONTINUED | OUTPATIENT
Start: 2019-05-10 | End: 2019-05-10

## 2019-05-10 RX ORDER — ASPIRIN/CALCIUM CARB/MAGNESIUM 324 MG
1 TABLET ORAL
Qty: 0 | Refills: 0 | DISCHARGE

## 2019-05-10 RX ORDER — ERGOCALCIFEROL 1.25 MG/1
1 CAPSULE ORAL
Qty: 0 | Refills: 0 | DISCHARGE

## 2019-05-10 RX ORDER — IRON,CARBONYL/ASCORBIC ACID 65MG-125MG
1 TABLET, DELAYED RELEASE (ENTERIC COATED) ORAL
Qty: 0 | Refills: 0 | DISCHARGE

## 2019-05-10 RX ORDER — ATORVASTATIN CALCIUM 80 MG/1
40 TABLET, FILM COATED ORAL AT BEDTIME
Refills: 0 | Status: DISCONTINUED | OUTPATIENT
Start: 2019-05-10 | End: 2019-05-25

## 2019-05-10 RX ORDER — QUETIAPINE FUMARATE 200 MG/1
100 TABLET, FILM COATED ORAL AT BEDTIME
Refills: 0 | Status: DISCONTINUED | OUTPATIENT
Start: 2019-05-10 | End: 2019-05-25

## 2019-05-10 RX ORDER — CITALOPRAM 10 MG/1
1 TABLET, FILM COATED ORAL
Qty: 0 | Refills: 0 | DISCHARGE

## 2019-05-10 RX ORDER — LORATADINE 10 MG/1
10 TABLET ORAL DAILY
Refills: 0 | Status: DISCONTINUED | OUTPATIENT
Start: 2019-05-10 | End: 2019-05-25

## 2019-05-10 RX ORDER — FENTANYL CITRATE 50 UG/ML
25 INJECTION INTRAVENOUS
Refills: 0 | Status: DISCONTINUED | OUTPATIENT
Start: 2019-05-10 | End: 2019-05-10

## 2019-05-10 RX ORDER — FEXOFENADINE HCL 30 MG
1 TABLET ORAL
Qty: 0 | Refills: 0 | DISCHARGE

## 2019-05-10 RX ADMIN — CYCLOBENZAPRINE HYDROCHLORIDE 10 MILLIGRAM(S): 10 TABLET, FILM COATED ORAL at 13:23

## 2019-05-10 RX ADMIN — SODIUM CHLORIDE 75 MILLILITER(S): 9 INJECTION INTRAMUSCULAR; INTRAVENOUS; SUBCUTANEOUS at 10:37

## 2019-05-10 RX ADMIN — OXYCODONE HYDROCHLORIDE 5 MILLIGRAM(S): 5 TABLET ORAL at 13:55

## 2019-05-10 RX ADMIN — HYDROMORPHONE HYDROCHLORIDE 0.25 MILLIGRAM(S): 2 INJECTION INTRAMUSCULAR; INTRAVENOUS; SUBCUTANEOUS at 11:23

## 2019-05-10 RX ADMIN — HYDROMORPHONE HYDROCHLORIDE 0.25 MILLIGRAM(S): 2 INJECTION INTRAMUSCULAR; INTRAVENOUS; SUBCUTANEOUS at 11:13

## 2019-05-10 RX ADMIN — OXYCODONE HYDROCHLORIDE 5 MILLIGRAM(S): 5 TABLET ORAL at 13:22

## 2019-05-10 NOTE — ASU DISCHARGE PLAN (ADULT/PEDIATRIC) - CALL YOUR DOCTOR IF YOU HAVE ANY OF THE FOLLOWING:
Wound/Surgical Site with redness, or foul smelling discharge or pus/Numbness, tingling, color or temperature change to extremity/Excessive diarrhea/Fever greater than (need to indicate Fahrenheit or Celsius)/Nausea and vomiting that does not stop/Increased irritability or sluggishness/Inability to tolerate liquids or foods/Pain not relieved by Medications/Bleeding that does not stop/Swelling that gets worse/Unable to urinate

## 2019-05-10 NOTE — ASU DISCHARGE PLAN (ADULT/PEDIATRIC) - CARE PROVIDER_API CALL
Fish Vo)  Neurological Surgery  [address] [place] [address], [address]  [place] [address]  Phone: [phone]  Fax: [phone]  Follow Up Time:

## 2019-05-10 NOTE — ASU DISCHARGE PLAN (ADULT/PEDIATRIC) - ASU DC SPECIAL INSTRUCTIONSFT
No strenuous activity. No heavy lifting. Do not return to work until cleared by physician. No driving until cleared by physician.  You may remove any dressing and shower on the 3rd day after you surgery.  No soaking in tub, do not remove steri strips. Do not apply any ointments to incision.    Call your Neurosurgeon for appointment in 1 week for wound check  Follow up with your Private MD in 1-2 weeks.  Return to Emergency Department or contact your Neurosurgeon if any changes in mental status, weakness, numbness or tingling of extremities; difficulty swallowing; drainage or redness of wound, fever; pain in legs; difficulty urinating or constipation.  Do NOT restart your Aspirin or take any Motrin/NSAIDS until checking with your Neurosurgeon and instructed to do so.

## 2019-05-10 NOTE — PRE-ANESTHESIA EVALUATION ADULT - NSANTHPMHFT_GEN_ALL_CORE
54 M with asthma- controlled, IRVING not compliant with CPAP, morbid obesity s/p gastric bypass, remote history of MI 2008 at age 32 without intervention, HLD, anxiety, and depression with chronic spine disease- cervical disc disease s/p cervical fusion and lumber disc herniation s/p fusion and spinal cord stimulator.    METS >5, patient states activity limited lately due to back pain, participates in rehab without chest pain/ shortness of breath/ symptoms.

## 2019-05-10 NOTE — PRE-ANESTHESIA EVALUATION ADULT - NSANTHAIRWAYFT_ENT_ALL_CORE
MP 3, mouth opening >3 cm/ TM distance 6 cm, redundant neck tissue, slightly limited extension of cervical spine

## 2019-05-14 PROBLEM — G47.00 INSOMNIA, UNSPECIFIED: Chronic | Status: ACTIVE | Noted: 2019-04-26

## 2019-05-14 PROBLEM — F41.9 ANXIETY DISORDER, UNSPECIFIED: Chronic | Status: ACTIVE | Noted: 2019-04-26

## 2019-05-14 PROBLEM — M51.26 OTHER INTERVERTEBRAL DISC DISPLACEMENT, LUMBAR REGION: Chronic | Status: ACTIVE | Noted: 2019-04-26

## 2019-05-14 PROBLEM — E55.9 VITAMIN D DEFICIENCY, UNSPECIFIED: Chronic | Status: ACTIVE | Noted: 2019-04-26

## 2019-05-14 PROBLEM — D50.9 IRON DEFICIENCY ANEMIA, UNSPECIFIED: Chronic | Status: ACTIVE | Noted: 2019-04-26

## 2019-05-14 PROBLEM — M54.16 RADICULOPATHY, LUMBAR REGION: Chronic | Status: ACTIVE | Noted: 2019-04-26

## 2019-05-14 PROBLEM — E78.5 HYPERLIPIDEMIA, UNSPECIFIED: Chronic | Status: ACTIVE | Noted: 2019-04-26

## 2019-05-17 ENCOUNTER — APPOINTMENT (OUTPATIENT)
Dept: SPINE | Facility: CLINIC | Age: 54
End: 2019-05-17
Payer: COMMERCIAL

## 2019-05-17 VITALS
WEIGHT: 221 LBS | SYSTOLIC BLOOD PRESSURE: 104 MMHG | RESPIRATION RATE: 15 BRPM | TEMPERATURE: 97.8 F | HEART RATE: 101 BPM | HEIGHT: 67 IN | DIASTOLIC BLOOD PRESSURE: 71 MMHG | BODY MASS INDEX: 34.69 KG/M2

## 2019-05-17 DIAGNOSIS — M51.26 OTHER INTERVERTEBRAL DISC DISPLACEMENT, LUMBAR REGION: ICD-10-CM

## 2019-05-17 DIAGNOSIS — G89.29 LUMBAGO WITH SCIATICA, LEFT SIDE: ICD-10-CM

## 2019-05-17 DIAGNOSIS — M54.42 LUMBAGO WITH SCIATICA, LEFT SIDE: ICD-10-CM

## 2019-05-17 DIAGNOSIS — M54.41 LUMBAGO WITH SCIATICA, LEFT SIDE: ICD-10-CM

## 2019-05-17 PROCEDURE — 99024 POSTOP FOLLOW-UP VISIT: CPT

## 2019-05-17 RX ORDER — QUETIAPINE FUMARATE 50 MG/1
50 TABLET ORAL
Qty: 60 | Refills: 0 | Status: ACTIVE | COMMUNITY
Start: 2019-03-22

## 2019-05-17 RX ORDER — BUPROPION HYDROCHLORIDE 300 MG/1
300 TABLET, EXTENDED RELEASE ORAL
Qty: 30 | Refills: 0 | Status: ACTIVE | COMMUNITY
Start: 2019-01-25

## 2019-05-17 RX ORDER — TIZANIDINE 2 MG/1
2 TABLET ORAL
Qty: 28 | Refills: 0 | Status: COMPLETED | COMMUNITY
Start: 2018-12-13

## 2019-05-17 RX ORDER — CELECOXIB 100 MG/1
100 CAPSULE ORAL
Qty: 28 | Refills: 0 | Status: ACTIVE | COMMUNITY
Start: 2018-12-13

## 2019-05-17 RX ORDER — CEPHALEXIN 500 MG/1
500 CAPSULE ORAL
Qty: 4 | Refills: 0 | Status: COMPLETED | COMMUNITY
Start: 2018-12-10

## 2019-05-17 RX ORDER — METHYLPREDNISOLONE 4 MG/1
4 TABLET ORAL
Qty: 21 | Refills: 0 | Status: COMPLETED | COMMUNITY
Start: 2019-05-10

## 2019-05-17 RX ORDER — BUPROPION HYDROCHLORIDE 150 MG/1
150 TABLET, EXTENDED RELEASE ORAL
Qty: 30 | Refills: 0 | Status: COMPLETED | COMMUNITY
Start: 2019-01-28

## 2019-05-17 RX ORDER — ATORVASTATIN CALCIUM 20 MG/1
20 TABLET, FILM COATED ORAL
Qty: 90 | Refills: 0 | Status: ACTIVE | COMMUNITY
Start: 2018-11-26

## 2019-05-17 RX ORDER — BUPROPION HYDROCHLORIDE 150 MG/1
150 TABLET, FILM COATED, EXTENDED RELEASE ORAL
Qty: 180 | Refills: 0 | Status: COMPLETED | COMMUNITY
Start: 2018-11-30

## 2019-05-20 ENCOUNTER — OTHER (OUTPATIENT)
Age: 54
End: 2019-05-20

## 2019-06-12 ENCOUNTER — APPOINTMENT (OUTPATIENT)
Dept: SPINE | Facility: CLINIC | Age: 54
End: 2019-06-12

## 2020-11-12 ENCOUNTER — TRANSCRIPTION ENCOUNTER (OUTPATIENT)
Age: 55
End: 2020-11-12

## 2021-05-03 NOTE — H&P PST ADULT - PAIN LOCATION, PROFILE
back Rifampin Pregnancy And Lactation Text: This medication is Pregnancy Category C and it isn't know if it is safe during pregnancy. It is also excreted in breast milk and should not be used if you are breast feeding.

## 2023-11-22 NOTE — ASU PREOP CHECKLIST - 1.
Pt ambulated to the treatment area with a steady gait. Pt states \"I started having more frequent palpitations in the past 2 weeks. I have pressure in my chest and a feeling of breathlessness I have had a lot of anxiety in the past few weeks. \" Pt denies nausea, sweating or visual changes. \"
Preop teaching done and emotional support provided to patient and family. Safety provided.

## 2024-10-15 NOTE — H&P PST ADULT - PROBLEM SELECTOR PLAN 2
pt presents to ED from home for rash to bilateral shoulders, chest, back. c/o itching and burning. rash x 1 week unrelieved by topical cortisone cream. no known allergies. IRVING Precautions   OR booking notified